# Patient Record
Sex: MALE | URBAN - METROPOLITAN AREA
[De-identification: names, ages, dates, MRNs, and addresses within clinical notes are randomized per-mention and may not be internally consistent; named-entity substitution may affect disease eponyms.]

---

## 2024-11-30 ENCOUNTER — OFFICE VISIT (OUTPATIENT)
Dept: URGENT CARE | Age: 44
End: 2024-11-30
Payer: COMMERCIAL

## 2024-11-30 VITALS
BODY MASS INDEX: 28.56 KG/M2 | OXYGEN SATURATION: 98 % | DIASTOLIC BLOOD PRESSURE: 86 MMHG | HEART RATE: 86 BPM | HEIGHT: 67 IN | WEIGHT: 182 LBS | SYSTOLIC BLOOD PRESSURE: 127 MMHG | TEMPERATURE: 98 F | RESPIRATION RATE: 16 BRPM

## 2024-11-30 DIAGNOSIS — G47.00 INSOMNIA, UNSPECIFIED TYPE: Primary | ICD-10-CM

## 2024-11-30 PROCEDURE — 99203 OFFICE O/P NEW LOW 30 MIN: CPT | Performed by: PHYSICIAN ASSISTANT

## 2024-11-30 PROCEDURE — 3008F BODY MASS INDEX DOCD: CPT | Performed by: PHYSICIAN ASSISTANT

## 2024-11-30 RX ORDER — FLUTICASONE PROPIONATE 50 MCG
SPRAY, SUSPENSION (ML) NASAL
COMMUNITY
Start: 2023-12-21

## 2024-11-30 RX ORDER — AMOXICILLIN AND CLAVULANATE POTASSIUM 875; 125 MG/1; MG/1
TABLET, FILM COATED ORAL EVERY 12 HOURS
COMMUNITY

## 2024-11-30 RX ORDER — HYDROXYZINE PAMOATE 50 MG/1
50 CAPSULE ORAL NIGHTLY
Qty: 30 CAPSULE | Refills: 0 | Status: SHIPPED | OUTPATIENT
Start: 2024-11-30 | End: 2024-12-30

## 2024-11-30 NOTE — PATIENT INSTRUCTIONS
No tv, phone, ipad 2 hours prior to bed. Consider reading a book  Avoid caffeine particularly 2-4 hours before bed  Increase fluid intake to stay well hydrated   Pratice calming exercises like mediation and yoga prior to bed   Supplement wise you can try L-thianine, carlos, magnesium glycinate.   Follow up with PCP for further evaluation and treatment.

## 2024-11-30 NOTE — PROGRESS NOTES
"Subjective   Patient ID: Wilmer Sanchez is a 44 y.o. male. They present today with a chief complaint of Other (Pt complaining of insomnia x 3weeks. Tried OTC sleep aid now he can fall asleep but he can't stay asleep. ).    History of Present Illness  HPI    44-year-old patient presents to clinic with complaints of insomnia  described as initially having trouble falling asleep but now having trouble staying asleep for the past 3 weeks.  Reports had a stressful event initially and that has resolved but insomnia has continued.  Reports takes bupropion for about 2 years now and stopped it about 4 days ago when patient started taking melatonin as in the past has had adverse effect with taking both medications.  Reports has not had any depressive or anxiety symptoms since patient has been off of the bupropion.  Reports started taking magnesium glycinate, melatonin, sominex which has allowed pt. To fall asleep but not stay asleep.  Reports is sleeping about 3 to 4 hours at night.  Reports wakes up between 4 and 5 AM every morning no matter what time patient is able to fall asleep.  Reports about 1 cup of coffee 3 times weekly and no other caffeine drinks.  Reports no napping during the day.  Reports when patient wakes is very tired and by the end of the day is exhausted.  Denies constant worry, inability to relax, stimulant use, recent steroids, dizziness, tachycardia, presyncope.  Past Medical History  Allergies as of 11/30/2024    (No Known Allergies)       (Not in a hospital admission)       History reviewed. No pertinent past medical history.    History reviewed. No pertinent surgical history.         Review of Systems  Review of Systems     ROS negative with the exception as noted on HPI                            Objective    Vitals:    11/30/24 0948   BP: 127/86   Pulse: 86   Resp: 16   Temp: 36.7 °C (98 °F)   TempSrc: Oral   SpO2: 98%   Weight: 82.6 kg (182 lb)   Height: 1.702 m (5' 7\")     No LMP for male " patient.    Physical Exam  Constitutional:       Appearance: Normal appearance.   HENT:      Head: Normocephalic and atraumatic.   Cardiovascular:      Rate and Rhythm: Normal rate and regular rhythm.      Pulses: Normal pulses.      Heart sounds: Normal heart sounds.   Pulmonary:      Effort: Pulmonary effort is normal. No respiratory distress.      Breath sounds: Normal breath sounds. No wheezing or rhonchi.   Abdominal:      General: Bowel sounds are normal. There is no distension.      Palpations: Abdomen is soft.      Tenderness: There is no abdominal tenderness. There is no right CVA tenderness or left CVA tenderness.   Neurological:      Mental Status: He is alert.       Procedures    Point of Care Test & Imaging Results from this visit  No results found for this visit on 11/30/24.   No results found.    Diagnostic study results (if any) were reviewed by Tara Loo PA-C.    Assessment/Plan   Allergies, medications, history, and pertinent labs/EKGs/Imaging reviewed by Tara Loo PA-C.    insomnia  described as initially having trouble falling asleep but now having trouble staying asleep for the past 3 weeks.  Hydroxyzine started as needed until patient is able to follow-up with PCP and do further workup.  Advised no TV, phone, iPad 2 hours prior to bed.  Consider reading a book.  Avoid canteen 2 to 4 hours prior to bed.  Increase fluid intake and stay well-hydrated.  Practice calming exercises like meditation/yoga prior to bed.  May try supplements such as L-theanine, ANJEL, magnesium glycinate.  Follow-up with PCP for further evaluation and treatment. Risk, benefits, and potential side effects of medication(s) discussed with pt. Discussed disease/illness presentation, treatment options, progression, complications, and outcomes with patient. Pt. Has expressed understanding and is an agreement of plan of care.    Medical Decision Making      Orders and Diagnoses  There are no diagnoses linked  to this encounter.    Medical Admin Record      Patient disposition: Home    Electronically signed by Tara Loo PA-C  9:53 AM